# Patient Record
Sex: MALE | Race: WHITE | NOT HISPANIC OR LATINO | Employment: FULL TIME | URBAN - METROPOLITAN AREA
[De-identification: names, ages, dates, MRNs, and addresses within clinical notes are randomized per-mention and may not be internally consistent; named-entity substitution may affect disease eponyms.]

---

## 2022-08-26 ENCOUNTER — OFFICE VISIT (OUTPATIENT)
Dept: ORTHOPEDIC SURGERY | Facility: CLINIC | Age: 57
End: 2022-08-26

## 2022-08-26 VITALS — BODY MASS INDEX: 41.72 KG/M2 | HEIGHT: 70 IN | WEIGHT: 291.4 LBS | TEMPERATURE: 97.1 F

## 2022-08-26 DIAGNOSIS — M51.36 DISC DEGENERATION, LUMBAR: ICD-10-CM

## 2022-08-26 DIAGNOSIS — M48.062 SPINAL STENOSIS OF LUMBAR REGION WITH NEUROGENIC CLAUDICATION: Primary | ICD-10-CM

## 2022-08-26 PROCEDURE — 99203 OFFICE O/P NEW LOW 30 MIN: CPT | Performed by: ORTHOPAEDIC SURGERY

## 2022-08-26 RX ORDER — MINOCYCLINE HYDROCHLORIDE 100 MG/1
100 CAPSULE ORAL DAILY
COMMUNITY
Start: 2022-08-24

## 2022-08-26 RX ORDER — LOSARTAN POTASSIUM AND HYDROCHLOROTHIAZIDE 12.5; 1 MG/1; MG/1
TABLET ORAL
COMMUNITY
Start: 2022-08-23

## 2022-08-26 RX ORDER — IBUPROFEN 200 MG
200 TABLET ORAL EVERY 6 HOURS PRN
COMMUNITY

## 2022-08-26 RX ORDER — ACETAMINOPHEN 500 MG
500 TABLET ORAL EVERY 6 HOURS PRN
COMMUNITY

## 2022-08-26 RX ORDER — LAMOTRIGINE 200 MG/1
200 TABLET ORAL DAILY
COMMUNITY
Start: 2022-08-24

## 2022-08-26 NOTE — PROGRESS NOTES
New patient or new problem visit    CC: Low back pain, sciatica    HPI: Long history of low back pain and sciatica into the right lower extremity.  The leg pain exceeds the back pain in general.  Occasionally his legs feel like they want to give out but he has not frankly fallen.  Physical therapy and chiropractory helped somewhat and he uses a cane on occasion.  He works in a clerical capacity at [employer]  Sitting aggravates the pain he misses 2 to 3 days of work per month.  He states he had some trauma to the back in the .    PFSH: See attached    ROS: As above    PE: BMI is 41.8.  Good strength in the legs bilaterally reflexes are slightly diminished in patella but brisk in the Achilles.  No evidence of clonus.    XRAY: Plain film x-rays of the lumbar spine suggest congenitally narrowed spinal canal but well-maintained lordosis, multilevel spondylosis and severe endplate sclerosis is noted.  No comparison views are available.  MRI scan of the lumbar spine is available along with report thereof and he appears to have some mild stenosis at the 3 4 level but looks fairly good overall.    Other: n/a    Impression: Lumbar spinal stenosis lumbar disc degeneration, morbid obesity    Plan: I recommended epidural injections, continued core exercises and stretching for sciatica, and follow-up as needed.  I explained the risk and benefits of the epidurals.  I told him repetitive trauma or  accidents unless documented at that time are certainly a possible contributor to his current predicament but certainly no evidence that they are likely cause thereof.